# Patient Record
Sex: MALE | Race: WHITE | Employment: UNEMPLOYED | URBAN - METROPOLITAN AREA
[De-identification: names, ages, dates, MRNs, and addresses within clinical notes are randomized per-mention and may not be internally consistent; named-entity substitution may affect disease eponyms.]

---
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
## 2021-02-21 DIAGNOSIS — L74.9 SWEATING ABNORMALITY: ICD-10-CM

## 2021-02-22 NOTE — TELEPHONE ENCOUNTER
Rx requested:  Requested Prescriptions     Pending Prescriptions Disp Refills    aluminum chloride (DRYSOL) 20 % external solution [Pharmacy Med Name: Drysol Dab-O-Matic 20 % topical solution] 35 mL 3     Sig: Apply topically nightly until sweat decreased then use once weekly       Last Office Visit:   6/8/2020      Last filled:  8/12/2020    Next Visit Date:  No future appointments.

## 2021-03-27 DIAGNOSIS — R11.15 NON-INTRACTABLE CYCLICAL VOMITING WITHOUT NAUSEA: ICD-10-CM

## 2021-03-27 DIAGNOSIS — F32.89 OTHER DEPRESSION: ICD-10-CM

## 2021-03-29 RX ORDER — FLUOXETINE 10 MG/1
10 CAPSULE ORAL DAILY
Qty: 60 CAPSULE | Refills: 2 | Status: SHIPPED | OUTPATIENT
Start: 2021-03-29 | End: 2021-09-17

## 2021-03-29 RX ORDER — OMEPRAZOLE 20 MG/1
20 CAPSULE, DELAYED RELEASE ORAL 2 TIMES DAILY
Qty: 60 CAPSULE | Refills: 2 | Status: SHIPPED | OUTPATIENT
Start: 2021-03-29 | End: 2021-06-07

## 2021-03-29 NOTE — TELEPHONE ENCOUNTER
Rx requested:  Requested Prescriptions     Pending Prescriptions Disp Refills    omeprazole (PRILOSEC) 20 MG delayed release capsule [Pharmacy Med Name: omeprazole 20 mg capsule,delayed release] 60 capsule 2     Sig: Take 1 capsule by mouth 2 times daily    FLUoxetine (PROZAC) 10 MG capsule [Pharmacy Med Name: fluoxetine 10 mg capsule] 60 capsule 2     Sig: TAKE 1 CAPSULE BY MOUTH DAILY       Last Office Visit:   6/8/2020      Last filled:  Omeprazole 12/18/2020  Prozac 8/12/2020    Next Visit Date:  No future appointments.

## 2021-05-03 RX ORDER — GLYCOPYRROLATE 1 MG/1
TABLET ORAL
Qty: 120 TABLET | Refills: 5 | OUTPATIENT
Start: 2021-05-03

## 2021-05-14 DIAGNOSIS — F51.01 PRIMARY INSOMNIA: ICD-10-CM

## 2021-05-14 DIAGNOSIS — M54.50 ACUTE BILATERAL LOW BACK PAIN WITHOUT SCIATICA: ICD-10-CM

## 2021-05-14 RX ORDER — MELATONIN 10 MG
CAPSULE ORAL
Qty: 30 CAPSULE | Refills: 5 | OUTPATIENT
Start: 2021-05-14

## 2021-05-14 RX ORDER — MELOXICAM 7.5 MG/1
7.5 TABLET ORAL DAILY
Qty: 90 TABLET | Refills: 1 | OUTPATIENT
Start: 2021-05-14

## 2021-05-17 ENCOUNTER — OFFICE VISIT (OUTPATIENT)
Dept: FAMILY MEDICINE CLINIC | Age: 21
End: 2021-05-17
Payer: COMMERCIAL

## 2021-05-17 VITALS
WEIGHT: 204 LBS | TEMPERATURE: 97.3 F | OXYGEN SATURATION: 96 % | HEIGHT: 74 IN | DIASTOLIC BLOOD PRESSURE: 82 MMHG | HEART RATE: 84 BPM | BODY MASS INDEX: 26.18 KG/M2 | SYSTOLIC BLOOD PRESSURE: 138 MMHG

## 2021-05-17 DIAGNOSIS — B02.30 HERPES ZOSTER WITH OPHTHALMIC COMPLICATION, UNSPECIFIED HERPES ZOSTER EYE DISEASE: Primary | ICD-10-CM

## 2021-05-17 DIAGNOSIS — M54.50 ACUTE BILATERAL LOW BACK PAIN WITHOUT SCIATICA: ICD-10-CM

## 2021-05-17 DIAGNOSIS — L03.213 PRESEPTAL CELLULITIS OF RIGHT EYE: ICD-10-CM

## 2021-05-17 DIAGNOSIS — H10.31 ACUTE BACTERIAL CONJUNCTIVITIS OF RIGHT EYE: ICD-10-CM

## 2021-05-17 PROCEDURE — 99213 OFFICE O/P EST LOW 20 MIN: CPT | Performed by: FAMILY MEDICINE

## 2021-05-17 PROCEDURE — G8427 DOCREV CUR MEDS BY ELIG CLIN: HCPCS | Performed by: FAMILY MEDICINE

## 2021-05-17 PROCEDURE — 1036F TOBACCO NON-USER: CPT | Performed by: FAMILY MEDICINE

## 2021-05-17 PROCEDURE — G8417 CALC BMI ABV UP PARAM F/U: HCPCS | Performed by: FAMILY MEDICINE

## 2021-05-17 RX ORDER — BACITRACIN ZINC AND POLYMYXIN B SULFATE 500; 10000 [USP'U]/G; [USP'U]/G
0.5 OINTMENT OPHTHALMIC 2 TIMES DAILY
Qty: 3.5 G | Refills: 0 | Status: SHIPPED | OUTPATIENT
Start: 2021-05-17 | End: 2021-07-26 | Stop reason: SDUPTHER

## 2021-05-17 RX ORDER — MELOXICAM 7.5 MG/1
7.5 TABLET ORAL DAILY
Qty: 90 TABLET | Refills: 2 | Status: SHIPPED | OUTPATIENT
Start: 2021-05-17 | End: 2022-03-07

## 2021-05-17 RX ORDER — VALACYCLOVIR HYDROCHLORIDE 1 G/1
1000 TABLET, FILM COATED ORAL 2 TIMES DAILY
Qty: 20 TABLET | Refills: 0 | Status: SHIPPED | OUTPATIENT
Start: 2021-05-17 | End: 2021-07-26 | Stop reason: SDUPTHER

## 2021-05-17 RX ORDER — SULFAMETHOXAZOLE AND TRIMETHOPRIM 800; 160 MG/1; MG/1
1 TABLET ORAL 2 TIMES DAILY
Qty: 14 TABLET | Refills: 0 | Status: SHIPPED | OUTPATIENT
Start: 2021-05-17 | End: 2021-07-26 | Stop reason: SDUPTHER

## 2021-05-17 SDOH — ECONOMIC STABILITY: FOOD INSECURITY: WITHIN THE PAST 12 MONTHS, THE FOOD YOU BOUGHT JUST DIDN'T LAST AND YOU DIDN'T HAVE MONEY TO GET MORE.: NEVER TRUE

## 2021-05-17 SDOH — ECONOMIC STABILITY: FOOD INSECURITY: WITHIN THE PAST 12 MONTHS, YOU WORRIED THAT YOUR FOOD WOULD RUN OUT BEFORE YOU GOT MONEY TO BUY MORE.: NEVER TRUE

## 2021-05-17 ASSESSMENT — ENCOUNTER SYMPTOMS
CONSTIPATION: 0
DIARRHEA: 0
RHINORRHEA: 0
SHORTNESS OF BREATH: 0
SORE THROAT: 0
WHEEZING: 0
COUGH: 0
ABDOMINAL PAIN: 0

## 2021-05-17 ASSESSMENT — SOCIAL DETERMINANTS OF HEALTH (SDOH): HOW HARD IS IT FOR YOU TO PAY FOR THE VERY BASICS LIKE FOOD, HOUSING, MEDICAL CARE, AND HEATING?: NOT HARD AT ALL

## 2021-05-17 NOTE — PROGRESS NOTES
[address] [facility] PR[facility]  [address] [place] [id number]  Dept: [phone]  Dept Fax: : [phone]     Chief Complaint  Chief Complaint   Patient presents with    Medication Refill     mobic    Eye Problem     rash near right eye       HPI:  24 y.o.male who presents for the following:      R eye problem: x2 days with recurrence of rash outside the the R eye on the cheek; irritated; has been applying topical abx without relief. 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 HENT: Negative for congestion, rhinorrhea and sore throat. Respiratory: Negative for cough, shortness of breath and wheezing. Gastrointestinal: Negative for abdominal pain, constipation and diarrhea. Endocrine: Negative for polydipsia and polyuria. Genitourinary: Negative for dysuria, frequency and urgency. Skin: Positive for rash. Neurological: Negative for syncope, light-headedness, numbness and headaches. Psychiatric/Behavioral: Negative for sleep disturbance. The patient is not nervous/anxious.         Past Medical History:   Diagnosis Date    Asperger syndrome     Excessive sleepiness 1/10/2019    Heart murmur     Narcolepsy     Thyroglossal cyst      Past Surgical History:   Procedure Laterality Date    CIRCUMCISION      FRACTURE SURGERY      TYMPANOSTOMY TUBE PLACEMENT       Social History     Socioeconomic History    Marital status: Single     Spouse name: Not on file    Number of children: Not on file    Years of education: Not on file    Highest education level: Not on file   Occupational History    Not on file   Tobacco Use    Smoking status: Passive Smoke Exposure - Never Smoker    Smokeless tobacco: Never Used   Vaping Use    Vaping Use: Never used   Substance and Sexual Activity    Alcohol use: No    Drug use: No    Sexual activity: Yes     Partners: Female   Other Topics Concern    Not on file   Social History Narrative    Not on file     Social Determinants of Health     Financial Resource Strain: Low Risk     Difficulty of Paying Living Expenses: Not hard at all   Food Insecurity: No Food Insecurity    Worried About Running Out of Food in the Last Year: Never true    [address] in the Last Year: Never true   Transportation Needs:     Lack of Transportation (Medical):  Lack of Transportation (Non-Medical):    Physical Activity:     Days of Exercise per Week:     Minutes of Exercise per Session:    Stress:     Feeling of Stress :    Social Connections:     Frequency of Communication with Friends and Family:     Frequency of Social Gatherings with Friends and Family:     Attends Gnosticist Services:     Active Member of Clubs or Organizations:     Attends Club or Organization Meetings:     Marital Status:    Intimate Partner Violence:     Fear of Current or Ex-Partner:     Emotionally Abused:     Physically Abused:     Sexually Abused:      Family History   Problem Relation Age of Onset    Cancer Maternal Grandfather         brain    Cancer Paternal Grandfather 61        lung, brain      Allergies   Allergen Reactions    Seasonal      Current Outpatient Medications   Medication Sig Dispense Refill    meloxicam (MOBIC) 7.5 MG tablet Take 1 tablet by mouth daily 90 tablet 2    valACYclovir (VALTREX) 1 g tablet Take 1 tablet by mouth 2 times daily for 10 days 20 tablet 0    sulfamethoxazole-trimethoprim (BACTRIM DS;SEPTRA DS) 800-160 MG per tablet Take 1 tablet by mouth 2 times daily for 7 days 14 tablet 0    bacitracin-polymyxin b (POLYSPORIN) 500-71419 UNIT/GM ophthalmic ointment Place 0.5 inches into the right eye 2 times daily for 10 days Every 12 hours.  3.5 g 0    omeprazole (PRILOSEC) 20 MG delayed release capsule Take 1 capsule by mouth 2 times daily 60 capsule 2    aluminum chloride (DRYSOL) 20 % external solution Apply topically nightly until sweat decreased then use once weekly 35 mL 3    melatonin 10 MG CAPS capsule Take 10 mg by mouth nightly as needed (insomnia) 30 capsule 5    glycopyrrolate (ROBINUL) 1 MG tablet TAKE 2 TABLETS BY MOUTH TWICE DAILY 120 tablet 5    modafinil (PROVIGIL) 100 MG tablet Take 100 mg by mouth once as needed (Takes in the afternoon).  modafinil (PROVIGIL) 200 MG tablet Take 200 mg by mouth daily.  Benzoyl Peroxide (PANOXYL WASH) 10 % external wash Apply topically 2 times daily. 156 g 1    ibuprofen (ADVIL;MOTRIN) 600 MG tablet TAKE 1 TABLET BY MOUTH EVERY 6 HOURS AS NEEDED FOR PAIN 30 tablet 0    clotrimazole (LOTRIMIN) 1 % cream Apply topically 2 times daily. 15 g 2    fluticasone (FLONASE) 50 MCG/ACT nasal spray 1 spray by Nasal route daily 1 Bottle 3    albuterol sulfate  (90 Base) MCG/ACT inhaler Inhale 2 puffs into the lungs 4 times daily as needed for Wheezing or Shortness of Breath 1 Inhaler 5    sildenafil (VIAGRA) 25 MG tablet TAKE TWO TABLETS BY MOUTH DAILY for 360 days  2    FLUoxetine (PROZAC) 10 MG capsule TAKE 1 CAPSULE BY MOUTH DAILY 60 capsule 2    sodium oxybate (XYREM) 500 MG/ML SOLN solution Take 4.5 mLs by mouth 2 times daily for 30 days. 1 Bottle 3    sodium oxybate (XYREM) 500 MG/ML SOLN solution Take 4.5 mLs by mouth 2 times daily for 30 days. 1 Bottle 5     No current facility-administered medications for this visit. Vitals:    05/17/21 1633   BP: 138/82   Pulse: 84   Temp: 97.3 °F (36.3 °C)   SpO2: 96%   Weight: 204 lb (92.5 kg)   Height: 6' 2\" (1.88 m)       Physical exam:  Physical Exam  Vitals reviewed. Constitutional:       General: He is not in acute distress. Appearance: He is well-developed. HENT:      Head: Normocephalic and atraumatic. Cardiovascular:      Rate and Rhythm: Normal rate.    Pulmonary:      Effort: Pulmonary effort is normal. No respiratory distress. Musculoskeletal:      Cervical back: Normal range of motion. Skin:     General: Skin is warm and dry. Neurological:      Mental Status: He is alert and oriented to person, place, and time. Psychiatric:         Behavior: Behavior normal.         Assessment/Plan:  24 y.o. male here mainly for face rash:  - Looks like recurrence of the shingles; will tx aggressively like the last 2 times given the importance of the area (antiviral, PO abx, eye abx). Could consider descalating to simply antiviral in the future recurrences     Diagnosis Orders   1. Herpes zoster with ophthalmic complication, unspecified herpes zoster eye disease  valACYclovir (VALTREX) 1 g tablet   2. Acute bilateral low back pain without sciatica  meloxicam (MOBIC) 7.5 MG tablet   3. Preseptal cellulitis of right eye  sulfamethoxazole-trimethoprim (BACTRIM DS;SEPTRA DS) 800-160 MG per tablet   4. Acute bacterial conjunctivitis of right eye  bacitracin-polymyxin b (POLYSPORIN) 500-47897 UNIT/GM ophthalmic ointment        Return if symptoms worsen or fail to improve.     Jericho Izaguirre MD

## 2021-05-31 DIAGNOSIS — F51.01 PRIMARY INSOMNIA: ICD-10-CM

## 2021-06-01 RX ORDER — GLYCOPYRROLATE 1 MG/1
TABLET ORAL
Qty: 120 TABLET | Refills: 5 | OUTPATIENT
Start: 2021-06-01

## 2021-06-01 RX ORDER — MELATONIN 10 MG
CAPSULE ORAL
Qty: 30 CAPSULE | Refills: 5 | Status: SHIPPED | OUTPATIENT
Start: 2021-06-01 | End: 2021-11-28

## 2021-06-01 NOTE — TELEPHONE ENCOUNTER
Rx requested:  Requested Prescriptions     Pending Prescriptions Disp Refills    melatonin 10 MG CAPS capsule [Pharmacy Med Name: melatonin 10 mg capsule] 30 capsule 5     Sig: TAKE 1 CAPSULE BY MOUTH NIGHTLY AS NEEDED FOR INSOMNIA       Last Office Visit:   5/17/2021      Last filled:  12/20/21    Next Visit Date:  No future appointments.

## 2021-06-06 DIAGNOSIS — L74.9 SWEATING ABNORMALITY: ICD-10-CM

## 2021-06-06 DIAGNOSIS — R11.15 NON-INTRACTABLE CYCLICAL VOMITING WITHOUT NAUSEA: ICD-10-CM

## 2021-06-07 RX ORDER — OMEPRAZOLE 20 MG/1
20 CAPSULE, DELAYED RELEASE ORAL 2 TIMES DAILY
Qty: 60 CAPSULE | Refills: 2 | Status: SHIPPED | OUTPATIENT
Start: 2021-06-07 | End: 2021-09-17

## 2021-06-07 NOTE — TELEPHONE ENCOUNTER
Rx requested:  Requested Prescriptions     Pending Prescriptions Disp Refills    aluminum chloride (DRYSOL) 20 % external solution [Pharmacy Med Name: Drysol Dab-O-Matic 20 % topical solution] 35 mL 3     Sig: Apply topically nightly until sweat decreases then use once weekly    omeprazole (PRILOSEC) 20 MG delayed release capsule [Pharmacy Med Name: omeprazole 20 mg capsule,delayed release] 60 capsule 2     Sig: Take 1 capsule by mouth 2 times daily       Last Office Visit:   5/17/2021      Last filled:  3/29/2021    Next Visit Date:  No future appointments.

## 2021-06-30 RX ORDER — GLYCOPYRROLATE 1 MG/1
TABLET ORAL
Qty: 120 TABLET | Refills: 5 | OUTPATIENT
Start: 2021-06-30

## 2021-07-26 ENCOUNTER — VIRTUAL VISIT (OUTPATIENT)
Dept: FAMILY MEDICINE CLINIC | Age: 21
End: 2021-07-26
Payer: COMMERCIAL

## 2021-07-26 DIAGNOSIS — B02.30 HERPES ZOSTER WITH OPHTHALMIC COMPLICATION, UNSPECIFIED HERPES ZOSTER EYE DISEASE: Primary | ICD-10-CM

## 2021-07-26 DIAGNOSIS — L03.213 PRESEPTAL CELLULITIS OF RIGHT EYE: ICD-10-CM

## 2021-07-26 DIAGNOSIS — L74.9 SWEATING ABNORMALITY: ICD-10-CM

## 2021-07-26 DIAGNOSIS — H10.31 ACUTE BACTERIAL CONJUNCTIVITIS OF RIGHT EYE: ICD-10-CM

## 2021-07-26 PROCEDURE — G8427 DOCREV CUR MEDS BY ELIG CLIN: HCPCS | Performed by: FAMILY MEDICINE

## 2021-07-26 PROCEDURE — 1036F TOBACCO NON-USER: CPT | Performed by: FAMILY MEDICINE

## 2021-07-26 PROCEDURE — G8417 CALC BMI ABV UP PARAM F/U: HCPCS | Performed by: FAMILY MEDICINE

## 2021-07-26 PROCEDURE — 99213 OFFICE O/P EST LOW 20 MIN: CPT | Performed by: FAMILY MEDICINE

## 2021-07-26 RX ORDER — GLYCOPYRROLATE 1 MG/1
TABLET ORAL
Qty: 120 TABLET | Refills: 5 | Status: SHIPPED | OUTPATIENT
Start: 2021-07-26 | End: 2022-02-07

## 2021-07-26 RX ORDER — BACITRACIN ZINC AND POLYMYXIN B SULFATE 500; 10000 [USP'U]/G; [USP'U]/G
0.5 OINTMENT OPHTHALMIC 2 TIMES DAILY
Qty: 3.5 G | Refills: 1 | Status: SHIPPED | OUTPATIENT
Start: 2021-07-26 | End: 2021-08-05

## 2021-07-26 RX ORDER — SULFAMETHOXAZOLE AND TRIMETHOPRIM 800; 160 MG/1; MG/1
1 TABLET ORAL 2 TIMES DAILY
Qty: 14 TABLET | Refills: 1 | Status: SHIPPED | OUTPATIENT
Start: 2021-07-26 | End: 2021-08-02

## 2021-07-26 RX ORDER — VALACYCLOVIR HYDROCHLORIDE 1 G/1
1000 TABLET, FILM COATED ORAL 2 TIMES DAILY
Qty: 20 TABLET | Refills: 1 | Status: SHIPPED | OUTPATIENT
Start: 2021-07-26 | End: 2022-04-04 | Stop reason: SDUPTHER

## 2021-07-26 RX ORDER — ARMODAFINIL 200 MG/1
TABLET ORAL
COMMUNITY
Start: 2021-07-01

## 2021-07-26 ASSESSMENT — ENCOUNTER SYMPTOMS
DIARRHEA: 0
CONSTIPATION: 0
COUGH: 0
SHORTNESS OF BREATH: 0
RHINORRHEA: 0
ABDOMINAL PAIN: 0
SORE THROAT: 0
WHEEZING: 0

## 2021-07-26 NOTE — PROGRESS NOTES
[address]  Virtual Visit  [address] [facility] [address] [facility] PRIMARY [facility]ARE  [phone] [id number]  Dept: [phone]  Dept Fax: : [phone]     Due to COVID 23 outbreak, patient's office visit was converted to a virtual visit. Patient was contacted and agreed to proceed with a virtual visit via [employer]. me  The risks and benefits of converting to a virtual visit were discussed in light of the current infectious disease epidemic. Patient also understood that insurance coverage and co-pays are up to their individual insurance plans. Chief Complaint  Chief Complaint   Patient presents with    Herpes Zoster     x 3 days, eyes, swelling     Discuss Medications     sweating       HPI:  24 y.o.male who presents for the following:      Face rash: thinks has recurrence of the R face/eye shingles; this will be the 3rd recurrence. No vision changes or eye pain but red lesions are starting up on the upper lid. 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 HENT: Negative for congestion, rhinorrhea and sore throat. Respiratory: Negative for cough, shortness of breath and wheezing. Gastrointestinal: Negative for abdominal pain, constipation and diarrhea. Endocrine: Negative for polydipsia and polyuria. Genitourinary: Negative for dysuria, frequency and urgency. Skin: Positive for rash. Neurological: Negative for syncope, light-headedness, numbness and headaches. Psychiatric/Behavioral: Negative for sleep disturbance. The patient is not nervous/anxious.         Past Medical History:   Diagnosis Date    Asperger syndrome     Excessive sleepiness 1/10/2019    Heart murmur     Narcolepsy     Thyroglossal cyst      Past Surgical History:   Procedure Laterality Date    CIRCUMCISION in the afternoon AS NEEDED      valACYclovir (VALTREX) 1 g tablet Take 1 tablet by mouth 2 times daily for 10 days 20 tablet 1    sulfamethoxazole-trimethoprim (BACTRIM DS;SEPTRA DS) 800-160 MG per tablet Take 1 tablet by mouth 2 times daily for 7 days 14 tablet 1    bacitracin-polymyxin b (POLYSPORIN) 500-88782 UNIT/GM ophthalmic ointment Place 0.5 inches into the right eye 2 times daily for 10 days Every 12 hours. 3.5 g 1    glycopyrrolate (ROBINUL) 1 MG tablet TAKE 2 TABLETS BY MOUTH TWICE DAILY 120 tablet 5    aluminum chloride (DRYSOL) 20 % external solution Apply topically nightly until sweat decreases then use once weekly 35 mL 3    omeprazole (PRILOSEC) 20 MG delayed release capsule Take 1 capsule by mouth 2 times daily 60 capsule 2    melatonin 10 MG CAPS capsule TAKE 1 CAPSULE BY MOUTH NIGHTLY AS NEEDED FOR INSOMNIA 30 capsule 5    meloxicam (MOBIC) 7.5 MG tablet Take 1 tablet by mouth daily 90 tablet 2    modafinil (PROVIGIL) 100 MG tablet Take 100 mg by mouth once as needed (Takes in the afternoon).  modafinil (PROVIGIL) 200 MG tablet Take 200 mg by mouth daily.  Benzoyl Peroxide (PANOXYL WASH) 10 % external wash Apply topically 2 times daily. 156 g 1    ibuprofen (ADVIL;MOTRIN) 600 MG tablet TAKE 1 TABLET BY MOUTH EVERY 6 HOURS AS NEEDED FOR PAIN 30 tablet 0    clotrimazole (LOTRIMIN) 1 % cream Apply topically 2 times daily. 15 g 2    fluticasone (FLONASE) 50 MCG/ACT nasal spray 1 spray by Nasal route daily 1 Bottle 3    albuterol sulfate  (90 Base) MCG/ACT inhaler Inhale 2 puffs into the lungs 4 times daily as needed for Wheezing or Shortness of Breath 1 Inhaler 5    sildenafil (VIAGRA) 25 MG tablet TAKE TWO TABLETS BY MOUTH DAILY for 360 days  2    FLUoxetine (PROZAC) 10 MG capsule TAKE 1 CAPSULE BY MOUTH DAILY 60 capsule 2    sodium oxybate (XYREM) 500 MG/ML SOLN solution Take 4.5 mLs by mouth 2 times daily for 30 days.  1 Bottle 3     No current facility-administered medications for this visit. Physical exam:  Physical Exam  Vitals reviewed. Constitutional:       General: He is not in acute distress. Appearance: He is well-developed. HENT:      Head: Normocephalic and atraumatic. Cardiovascular:      Rate and Rhythm: Normal rate. Pulmonary:      Effort: Pulmonary effort is normal. No respiratory distress. Musculoskeletal:      Cervical back: Normal range of motion. Skin:     General: Skin is warm and dry. Neurological:      Mental Status: He is alert and oriented to person, place, and time. Psychiatric:         Behavior: Behavior normal.         Assessment/Plan:  24 y.o. male here mainly for Shingles:  - will treat aggressively since this involves the eye. Provided the usual combo with refills. Might consider daily valtrex in the future     Diagnosis Orders   1. Herpes zoster with ophthalmic complication, unspecified herpes zoster eye disease  valACYclovir (VALTREX) 1 g tablet   2. Preseptal cellulitis of right eye  sulfamethoxazole-trimethoprim (BACTRIM DS;SEPTRA DS) 800-160 MG per tablet   3. Acute bacterial conjunctivitis of right eye  bacitracin-polymyxin b (POLYSPORIN) 500-17476 UNIT/GM ophthalmic ointment   4. Sweating abnormality  glycopyrrolate (ROBINUL) 1 MG tablet        Return if symptoms worsen or fail to improve. Julia Villarreal MD       Pursuant to the emergency declaration under the [facility], [facility] waiver authority and the [employer] Act, this Virtual  Visit was conducted, with patient's consent, to reduce the patient's risk of exposure to COVID-19 and provide continuity of care for an established patient. Services were provided through a video synchronous discussion virtually to substitute for in-person clinic visit.

## 2021-09-17 DIAGNOSIS — R11.15 NON-INTRACTABLE CYCLICAL VOMITING WITHOUT NAUSEA: ICD-10-CM

## 2021-09-17 DIAGNOSIS — F32.89 OTHER DEPRESSION: ICD-10-CM

## 2021-09-17 RX ORDER — FLUOXETINE 10 MG/1
10 CAPSULE ORAL DAILY
Qty: 60 CAPSULE | Refills: 2 | Status: SHIPPED | OUTPATIENT
Start: 2021-09-17 | End: 2021-09-22 | Stop reason: ALTCHOICE

## 2021-09-17 RX ORDER — OMEPRAZOLE 20 MG/1
20 CAPSULE, DELAYED RELEASE ORAL 2 TIMES DAILY
Qty: 60 CAPSULE | Refills: 2 | Status: SHIPPED | OUTPATIENT
Start: 2021-09-17 | End: 2021-12-20

## 2021-09-22 ENCOUNTER — OFFICE VISIT (OUTPATIENT)
Dept: INTERNAL MEDICINE | Age: 21
End: 2021-09-22
Payer: COMMERCIAL

## 2021-09-22 VITALS
HEIGHT: 74 IN | WEIGHT: 201 LBS | SYSTOLIC BLOOD PRESSURE: 126 MMHG | TEMPERATURE: 98.2 F | DIASTOLIC BLOOD PRESSURE: 80 MMHG | BODY MASS INDEX: 25.8 KG/M2 | HEART RATE: 51 BPM | OXYGEN SATURATION: 98 %

## 2021-09-22 DIAGNOSIS — T14.8XXA PUNCTURE WOUND: Primary | ICD-10-CM

## 2021-09-22 PROCEDURE — 90715 TDAP VACCINE 7 YRS/> IM: CPT | Performed by: FAMILY MEDICINE

## 2021-09-22 PROCEDURE — 1036F TOBACCO NON-USER: CPT | Performed by: FAMILY MEDICINE

## 2021-09-22 PROCEDURE — 99213 OFFICE O/P EST LOW 20 MIN: CPT | Performed by: FAMILY MEDICINE

## 2021-09-22 PROCEDURE — G8417 CALC BMI ABV UP PARAM F/U: HCPCS | Performed by: FAMILY MEDICINE

## 2021-09-22 PROCEDURE — G8427 DOCREV CUR MEDS BY ELIG CLIN: HCPCS | Performed by: FAMILY MEDICINE

## 2021-09-22 PROCEDURE — 90471 IMMUNIZATION ADMIN: CPT | Performed by: FAMILY MEDICINE

## 2021-09-22 ASSESSMENT — ENCOUNTER SYMPTOMS
RHINORRHEA: 0
WHEEZING: 0
ABDOMINAL PAIN: 0
SORE THROAT: 0
CONSTIPATION: 0
COUGH: 0
DIARRHEA: 0
SHORTNESS OF BREATH: 0

## 2021-09-22 NOTE — PROGRESS NOTES
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 HENT: Negative for congestion, rhinorrhea and sore throat. Respiratory: Negative for cough, shortness of breath and wheezing. Gastrointestinal: Negative for abdominal pain, constipation and diarrhea. Endocrine: Negative for polydipsia and polyuria. Genitourinary: Negative for dysuria, frequency and urgency. Skin: Positive for wound. Neurological: Negative for syncope, light-headedness, numbness and headaches. Psychiatric/Behavioral: Negative for sleep disturbance. The patient is not nervous/anxious.         Past Medical History:   Diagnosis Date    Asperger syndrome     Excessive sleepiness 1/10/2019    Heart murmur     Narcolepsy     Thyroglossal cyst      Past Surgical History:   Procedure Laterality Date    CIRCUMCISION      FRACTURE SURGERY      TYMPANOSTOMY TUBE PLACEMENT       Social History     Socioeconomic History    Marital status: Single     Spouse name: Not on file    Number of children: Not on file    Years of education: Not on file    Highest education level: Not on file   Occupational History    Not on file   Tobacco Use    Smoking status: Passive Smoke Exposure - Never Smoker    Smokeless tobacco: Never Used   Vaping Use    Vaping Use: Never used   Substance and Sexual Activity    Alcohol use: No    Drug use: No    Sexual activity: Yes     Partners: Female   Other Topics Concern    Not on file   Social History Narrative    Not on file     Social Determinants of Health     Financial Resource Strain: Low Risk     Difficulty of Paying Living Expenses: Not hard at all   Food Insecurity: No Food Insecurity    Worried About Running Out of Food in the Last Year: Never true    [address] in the Last Year: Never true   Transportation Needs:     Lack of Transportation (Medical):      Lack of Transportation (Non-Medical):    Physical Activity:     Days of Exercise per Week:     Minutes of Exercise per Session:    Stress:     Feeling of Stress :    Social Connections:     Frequency of Communication with Friends and Family:     Frequency of Social Gatherings with Friends and Family:     Attends Muslim Services:     Active Member of Clubs or Organizations:     Attends Club or Organization Meetings:     Marital Status:    Intimate Partner Violence:     Fear of Current or Ex-Partner:     Emotionally Abused:     Physically Abused:     Sexually Abused:      Family History   Problem Relation Age of Onset    Cancer Maternal Grandfather         brain    Cancer Paternal Grandfather 61        lung, brain      Allergies   Allergen Reactions    Seasonal      Current Outpatient Medications   Medication Sig Dispense Refill    omeprazole (PRILOSEC) 20 MG delayed release capsule Take 1 capsule by mouth 2 times daily 60 capsule 2    Armodafinil 200 MG TABS TAKE 1 TABLET BY MOUTH DAILY WITH the 100 mg TABLET in the afternoon AS NEEDED      glycopyrrolate (ROBINUL) 1 MG tablet TAKE 2 TABLETS BY MOUTH TWICE DAILY 120 tablet 5    aluminum chloride (DRYSOL) 20 % external solution Apply topically nightly until sweat decreases then use once weekly 35 mL 3    melatonin 10 MG CAPS capsule TAKE 1 CAPSULE BY MOUTH NIGHTLY AS NEEDED FOR INSOMNIA 30 capsule 5    meloxicam (MOBIC) 7.5 MG tablet Take 1 tablet by mouth daily 90 tablet 2    modafinil (PROVIGIL) 100 MG tablet Take 100 mg by mouth once as needed (Takes in the afternoon).  modafinil (PROVIGIL) 200 MG tablet Take 200 mg by mouth daily.  Benzoyl Peroxide (PANOXYL WASH) 10 % external wash Apply topically 2 times daily. 156 g 1    ibuprofen (ADVIL;MOTRIN) 600 MG tablet TAKE 1 TABLET BY MOUTH EVERY 6 HOURS AS NEEDED FOR PAIN 30 tablet 0    clotrimazole (LOTRIMIN) 1 % cream Apply topically 2 times daily. 15 g 2    fluticasone (FLONASE) 50 MCG/ACT nasal spray 1 spray by Nasal route daily 1 Bottle 3    albuterol sulfate  (90 Base) MCG/ACT inhaler Inhale 2 puffs into the lungs 4 times daily as needed for Wheezing or Shortness of Breath 1 Inhaler 5    sildenafil (VIAGRA) 25 MG tablet TAKE TWO TABLETS BY MOUTH DAILY for 360 days  2    sodium oxybate (XYREM) 500 MG/ML SOLN solution Take 4.5 mLs by mouth 2 times daily for 30 days. 1 Bottle 3     No current facility-administered medications for this visit. Vitals:    09/22/21 1103   BP: 126/80   Site: Left Upper Arm   Position: Sitting   Cuff Size: Large Adult   Pulse: 51   Temp: 98.2 °F (36.8 °C)   TempSrc: Temporal   SpO2: 98%   Weight: 201 lb (91.2 kg)   Height: 6' 2\" (1.88 m)       Physical exam:  Physical Exam  Vitals reviewed. Constitutional:       General: He is not in acute distress. Appearance: He is well-developed. HENT:      Head: Normocephalic and atraumatic. Cardiovascular:      Rate and Rhythm: Normal rate. Pulmonary:      Effort: Pulmonary effort is normal. No respiratory distress. Musculoskeletal:      Cervical back: Normal range of motion. Skin:     General: Skin is warm and dry. Neurological:      Mental Status: He is alert and oriented to person, place, and time. Psychiatric:         Behavior: Behavior normal.         Assessment/Plan:  22 y.o. male here mainly for L foot puncture wound:  - updated his Tdap today; no signs on infection of the foot     Diagnosis Orders   1.  Puncture wound  Tdap (age 6y and older) IM ([employer] [facility]) Return if symptoms worsen or fail to improve.     Ro Mann MD

## 2021-10-02 DIAGNOSIS — L74.9 SWEATING ABNORMALITY: ICD-10-CM

## 2021-11-27 DIAGNOSIS — F51.01 PRIMARY INSOMNIA: ICD-10-CM

## 2021-11-28 RX ORDER — MELATONIN 10 MG
CAPSULE ORAL
Qty: 30 CAPSULE | Refills: 5 | Status: SHIPPED | OUTPATIENT
Start: 2021-11-28 | End: 2022-06-07

## 2021-11-28 NOTE — TELEPHONE ENCOUNTER
Comments: none    Last Office Visit (last PCP visit):   9/22/2021    Next Visit Date:  No future appointments. **If hasn't been seen in over a year OR hasn't followed up according to last diabetes/ADHD visit, make appointment for patient before sending refill to provider.     Rx requested:  Requested Prescriptions     Pending Prescriptions Disp Refills    melatonin 10 MG CAPS capsule [Pharmacy Med Name: melatonin 10 mg capsule] 30 capsule 5     Sig: TAKE 1 CAPSULE BY MOUTH NIGHTLY AS NEEDED FOR INSOMNIA

## 2022-02-07 DIAGNOSIS — L74.9 SWEATING ABNORMALITY: ICD-10-CM

## 2022-02-07 RX ORDER — GLYCOPYRROLATE 1 MG/1
TABLET ORAL
Qty: 120 TABLET | Refills: 5 | Status: SHIPPED | OUTPATIENT
Start: 2022-02-07 | End: 2022-07-19

## 2022-02-07 NOTE — TELEPHONE ENCOUNTER
Comments:     Last Office Visit (last PCP visit):   9/22/2021    Next Visit Date:  No future appointments. **If hasn't been seen in over a year OR hasn't followed up according to last diabetes/ADHD visit, make appointment for patient before sending refill to provider.     Rx requested:  Requested Prescriptions     Pending Prescriptions Disp Refills    glycopyrrolate (ROBINUL) 1 MG tablet [Pharmacy Med Name: glycopyrrolate 1 mg tablet] 120 tablet 5     Sig: TAKE 2 TABLETS BY MOUTH TWICE DAILY

## 2022-03-04 DIAGNOSIS — M54.50 ACUTE BILATERAL LOW BACK PAIN WITHOUT SCIATICA: ICD-10-CM

## 2022-03-04 DIAGNOSIS — L74.9 SWEATING ABNORMALITY: ICD-10-CM

## 2022-03-07 RX ORDER — MELOXICAM 7.5 MG/1
7.5 TABLET ORAL DAILY
Qty: 90 TABLET | Refills: 2 | Status: SHIPPED | OUTPATIENT
Start: 2022-03-07

## 2022-03-07 NOTE — TELEPHONE ENCOUNTER
Comments:     Last Office Visit (last PCP visit):   9/22/2021    Next Visit Date:  No future appointments. **If hasn't been seen in over a year OR hasn't followed up according to last diabetes/ADHD visit, make appointment for patient before sending refill to provider.     Rx requested:  Requested Prescriptions     Pending Prescriptions Disp Refills    meloxicam (MOBIC) 7.5 MG tablet [Pharmacy Med Name: meloxicam 7.5 mg tablet] 90 tablet 2     Sig: Take 1 tablet by mouth daily    aluminum chloride (DRYSOL) 20 % external solution [Pharmacy Med Name: Drysol Dab-O-Matic 20 % topical solution] 35 mL 2     Sig: Apply topically nightly until sweat decreases then use once weekly

## 2022-04-04 DIAGNOSIS — L01.00 IMPETIGO: ICD-10-CM

## 2022-04-04 DIAGNOSIS — B02.30 HERPES ZOSTER WITH OPHTHALMIC COMPLICATION, UNSPECIFIED HERPES ZOSTER EYE DISEASE: ICD-10-CM

## 2022-04-04 RX ORDER — VALACYCLOVIR HYDROCHLORIDE 1 G/1
1000 TABLET, FILM COATED ORAL 2 TIMES DAILY
Qty: 20 TABLET | Refills: 1 | Status: SHIPPED | OUTPATIENT
Start: 2022-04-04 | End: 2022-04-14

## 2022-04-04 NOTE — TELEPHONE ENCOUNTER
Comments:     Last Office Visit (last PCP visit):   9/22/2021    Next Visit Date:  No future appointments. **If hasn't been seen in over a year OR hasn't followed up according to last diabetes/ADHD visit, make appointment for patient before sending refill to provider.     Rx requested:  Requested Prescriptions     Pending Prescriptions Disp Refills    valACYclovir (VALTREX) 1 g tablet 20 tablet 1     Sig: Take 1 tablet by mouth 2 times daily for 10 days    mupirocin (BACTROBAN) 2 % ointment 15 g 1     Sig: Apply 3 times daily over forehead rash

## 2022-04-08 DIAGNOSIS — F32.89 OTHER DEPRESSION: ICD-10-CM

## 2022-04-08 RX ORDER — FLUOXETINE 10 MG/1
10 CAPSULE ORAL DAILY
Qty: 60 CAPSULE | Refills: 2 | Status: SHIPPED | OUTPATIENT
Start: 2022-04-08 | End: 2022-06-03

## 2022-04-08 NOTE — TELEPHONE ENCOUNTER
Comments:     Last Office Visit (last PCP visit):   9/22/2021    Next Visit Date:  No future appointments. **If hasn't been seen in over a year OR hasn't followed up according to last diabetes/ADHD visit, make appointment for patient before sending refill to provider.     Rx requested:  Requested Prescriptions     Pending Prescriptions Disp Refills    FLUoxetine (PROZAC) 10 MG capsule [Pharmacy Med Name: fluoxetine 10 mg capsule] 60 capsule 2     Sig: TAKE 1 CAPSULE BY MOUTH DAILY

## 2022-05-19 ENCOUNTER — TELEPHONE (OUTPATIENT)
Dept: FAMILY MEDICINE CLINIC | Age: 22
End: 2022-05-19

## 2022-05-19 NOTE — TELEPHONE ENCOUNTER
----- Message from [address] [facility] sent at 5/19/2022  3:48 PM EDT -----  Subject: Message to Provider    QUESTIONS  Information for Provider? Pt has appt on 06/02/2022 for a physical and   wants to know if he can get COVID vaccine when he comes in. Please call pt   and let him know  ---------------------------------------------------------------------------  --------------  CALL BACK INFO  What is the best way for the office to contact you? OK to leave message on   voicemail  Preferred Call Back Phone Number? [phone]  ---------------------------------------------------------------------------  --------------  SCRIPT ANSWERS  Relationship to Patient?  Self

## 2022-06-03 ENCOUNTER — OFFICE VISIT (OUTPATIENT)
Dept: FAMILY MEDICINE CLINIC | Age: 22
End: 2022-06-03
Payer: COMMERCIAL

## 2022-06-03 VITALS
TEMPERATURE: 98.4 F | DIASTOLIC BLOOD PRESSURE: 88 MMHG | SYSTOLIC BLOOD PRESSURE: 130 MMHG | HEART RATE: 61 BPM | HEIGHT: 74 IN | WEIGHT: 200 LBS | OXYGEN SATURATION: 98 % | BODY MASS INDEX: 25.67 KG/M2

## 2022-06-03 DIAGNOSIS — Z00.00 ANNUAL PHYSICAL EXAM: Primary | ICD-10-CM

## 2022-06-03 DIAGNOSIS — R76.11 TUBERCULIN SKIN TEST (TST) POSITIVE: ICD-10-CM

## 2022-06-03 DIAGNOSIS — Z23 ENCOUNTER FOR VACCINATION: ICD-10-CM

## 2022-06-03 PROCEDURE — G8427 DOCREV CUR MEDS BY ELIG CLIN: HCPCS | Performed by: FAMILY MEDICINE

## 2022-06-03 PROCEDURE — 90632 HEPA VACCINE ADULT IM: CPT | Performed by: FAMILY MEDICINE

## 2022-06-03 PROCEDURE — 90471 IMMUNIZATION ADMIN: CPT | Performed by: FAMILY MEDICINE

## 2022-06-03 PROCEDURE — 90716 VAR VACCINE LIVE SUBQ: CPT | Performed by: FAMILY MEDICINE

## 2022-06-03 PROCEDURE — G8417 CALC BMI ABV UP PARAM F/U: HCPCS | Performed by: FAMILY MEDICINE

## 2022-06-03 PROCEDURE — 90472 IMMUNIZATION ADMIN EACH ADD: CPT | Performed by: FAMILY MEDICINE

## 2022-06-03 PROCEDURE — 99395 PREV VISIT EST AGE 18-39: CPT | Performed by: FAMILY MEDICINE

## 2022-06-03 PROCEDURE — 86580 TB INTRADERMAL TEST: CPT | Performed by: FAMILY MEDICINE

## 2022-06-03 PROCEDURE — 1036F TOBACCO NON-USER: CPT | Performed by: FAMILY MEDICINE

## 2022-06-03 RX ORDER — ARMODAFINIL 250 MG/1
TABLET ORAL
COMMUNITY
Start: 2022-04-28

## 2022-06-03 SDOH — ECONOMIC STABILITY: FOOD INSECURITY: WITHIN THE PAST 12 MONTHS, THE FOOD YOU BOUGHT JUST DIDN'T LAST AND YOU DIDN'T HAVE MONEY TO GET MORE.: NEVER TRUE

## 2022-06-03 SDOH — ECONOMIC STABILITY: FOOD INSECURITY: WITHIN THE PAST 12 MONTHS, YOU WORRIED THAT YOUR FOOD WOULD RUN OUT BEFORE YOU GOT MONEY TO BUY MORE.: NEVER TRUE

## 2022-06-03 ASSESSMENT — PATIENT HEALTH QUESTIONNAIRE - PHQ9
9. THOUGHTS THAT YOU WOULD BE BETTER OFF DEAD, OR OF HURTING YOURSELF: 0
8. MOVING OR SPEAKING SO SLOWLY THAT OTHER PEOPLE COULD HAVE NOTICED. OR THE OPPOSITE, BEING SO FIGETY OR RESTLESS THAT YOU HAVE BEEN MOVING AROUND A LOT MORE THAN USUAL: 1
SUM OF ALL RESPONSES TO PHQ QUESTIONS 1-9: 8
SUM OF ALL RESPONSES TO PHQ9 QUESTIONS 1 & 2: 1
6. FEELING BAD ABOUT YOURSELF - OR THAT YOU ARE A FAILURE OR HAVE LET YOURSELF OR YOUR FAMILY DOWN: 1
3. TROUBLE FALLING OR STAYING ASLEEP: 0
SUM OF ALL RESPONSES TO PHQ QUESTIONS 1-9: 8
4. FEELING TIRED OR HAVING LITTLE ENERGY: 3
1. LITTLE INTEREST OR PLEASURE IN DOING THINGS: 0
5. POOR APPETITE OR OVEREATING: 0
7. TROUBLE CONCENTRATING ON THINGS, SUCH AS READING THE NEWSPAPER OR WATCHING TELEVISION: 2
SUM OF ALL RESPONSES TO PHQ QUESTIONS 1-9: 8
2. FEELING DOWN, DEPRESSED OR HOPELESS: 1
10. IF YOU CHECKED OFF ANY PROBLEMS, HOW DIFFICULT HAVE THESE PROBLEMS MADE IT FOR YOU TO DO YOUR WORK, TAKE CARE OF THINGS AT HOME, OR GET ALONG WITH OTHER PEOPLE: 0
SUM OF ALL RESPONSES TO PHQ QUESTIONS 1-9: 8

## 2022-06-03 ASSESSMENT — ENCOUNTER SYMPTOMS
ABDOMINAL PAIN: 0
SHORTNESS OF BREATH: 0
DIARRHEA: 0
CONSTIPATION: 0
WHEEZING: 0
SORE THROAT: 0
COUGH: 0
RHINORRHEA: 0

## 2022-06-03 ASSESSMENT — SOCIAL DETERMINANTS OF HEALTH (SDOH): HOW HARD IS IT FOR YOU TO PAY FOR THE VERY BASICS LIKE FOOD, HOUSING, MEDICAL CARE, AND HEATING?: NOT HARD AT ALL

## 2022-06-03 NOTE — PROGRESS NOTES
[address] 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 HENT: Negative for congestion, rhinorrhea and sore throat. Respiratory: Negative for cough, shortness of breath and wheezing. Gastrointestinal: Negative for abdominal pain, constipation and diarrhea. Endocrine: Negative for polydipsia and polyuria. Genitourinary: Negative for dysuria, frequency and urgency. Neurological: Negative for syncope, light-headedness, numbness and headaches. Psychiatric/Behavioral: Negative for sleep disturbance. The patient is not nervous/anxious.         Past Medical History:   Diagnosis Date    Asperger syndrome     Excessive sleepiness 1/10/2019    Heart murmur     Narcolepsy     Thyroglossal cyst      Past Surgical History:   Procedure Laterality Date    CIRCUMCISION      FRACTURE SURGERY      TYMPANOSTOMY TUBE PLACEMENT       Social History     Socioeconomic History    Marital status: Single     Spouse name: Not on file    Number of children: Not on file    Years of education: Not on file    Highest education level: Not on file   Occupational History    Not on file   Tobacco Use    Smoking status: Passive Smoke Exposure - Never Smoker    Smokeless tobacco: Never Used   Vaping Use    Vaping Use: Never used   Substance and Sexual Activity    Alcohol use: No    Drug use: No    Sexual activity: Yes     Partners: Female   Other Topics Concern    Not on file   Social History Narrative    Not on file     Social Determinants of Health     Financial Resource Strain: Low Risk     Difficulty of Paying Living Expenses: Not hard at all   Food Insecurity: No Food Insecurity    Worried About Running Out of Food in the Last Year: Never true    [place] of Food in the Last Year: Never true   Transportation Needs:     Lack of Transportation (Medical): Not on file    Lack of Transportation (Non-Medical):  Not on file   Physical Activity:     Days of Exercise per Week: Not on file    Minutes of Exercise per Session: Not on file   Stress:     Feeling of Stress : Not on file   Social Connections:     Frequency of Communication with Friends and Family: Not on file    Frequency of Social Gatherings with Friends and Family: Not on file    Attends Advent Services: Not on file    Active Member of [address] [place] [address] rankur or Organizations: Not on file    Attends Club or Organization Meetings: Not on file    Marital Status: Not on file   Intimate Partner Violence:     Fear of Current or Ex-Partner: Not on file    Emotionally Abused: Not on file    Physically Abused: Not on file    Sexually Abused: Not on file   Housing Stability:     Unable to Pay for Housing in the Last Year: Not on file    Number of [place] in the Last Year: Not on file    Unstable Housing in the Last Year: Not on file     Family History   Problem Relation Age of Onset    Cancer Maternal Grandfather         brain    Cancer Paternal Grandfather 61        lung, brain      Allergies   Allergen Reactions    Seasonal      Current Outpatient Medications   Medication Sig Dispense Refill    Armodafinil 250 MG TABS TAKE 1 TABLET BY MOUTH EVERY MORNING      meloxicam (MOBIC) 7.5 MG tablet Take 1 tablet by mouth daily 90 tablet 2    aluminum chloride (DRYSOL) 20 % external solution Apply topically nightly until sweat decreases then use once weekly 35 mL 2    glycopyrrolate (ROBINUL) 1 MG tablet TAKE 2 TABLETS BY MOUTH TWICE DAILY 120 tablet 5    omeprazole (PRILOSEC) 20 MG delayed release capsule Take 1 capsule by mouth 2 times daily 60 capsule 5    melatonin 10 MG CAPS capsule TAKE 1 CAPSULE BY MOUTH NIGHTLY AS NEEDED FOR INSOMNIA 30 capsule 5    Armodafinil 200 MG TABS TAKE 1 TABLET BY MOUTH DAILY WITH the 100 mg TABLET in the afternoon AS NEEDED      sodium oxybate (XYREM) 500 MG/ML SOLN solution Take 4.5 mLs by mouth 2 times daily for 30 days. 1 Bottle 3    modafinil (PROVIGIL) 100 MG tablet Take 100 mg by mouth once as needed (Takes in the afternoon).  modafinil (PROVIGIL) 200 MG tablet Take 200 mg by mouth daily.  Benzoyl Peroxide (PANOXYL WASH) 10 % external wash Apply topically 2 times daily. 156 g 1    ibuprofen (ADVIL;MOTRIN) 600 MG tablet TAKE 1 TABLET BY MOUTH EVERY 6 HOURS AS NEEDED FOR PAIN 30 tablet 0    clotrimazole (LOTRIMIN) 1 % cream Apply topically 2 times daily. 15 g 2    fluticasone (FLONASE) 50 MCG/ACT nasal spray 1 spray by Nasal route daily 1 Bottle 3    albuterol sulfate  (90 Base) MCG/ACT inhaler Inhale 2 puffs into the lungs 4 times daily as needed for Wheezing or Shortness of Breath 1 Inhaler 5    sildenafil (VIAGRA) 25 MG tablet TAKE TWO TABLETS BY MOUTH DAILY for 360 days  2     No current facility-administered medications for this visit. Vitals:    06/03/22 1640   BP: 130/88   Pulse: 61   Temp: 98.4 °F (36.9 °C)   TempSrc: Infrared   SpO2: 98%   Weight: 200 lb (90.7 kg)   Height: 6' 2\" (1.88 m)       Physical exam:  Physical Exam  Vitals reviewed. Constitutional:       General: He is not in acute distress. Appearance: He is well-developed. HENT:      Head: Normocephalic and atraumatic. Mouth/Throat:      Pharynx: No oropharyngeal exudate. Neck:      Thyroid: No thyromegaly. Cardiovascular:      Rate and Rhythm: Normal rate and regular rhythm. Heart sounds: Normal heart sounds. No murmur heard. Pulmonary:      Effort: Pulmonary effort is normal. No respiratory distress.       Breath sounds: Normal breath sounds. No wheezing. Abdominal:      General: There is no distension. Palpations: Abdomen is soft. Tenderness: There is no abdominal tenderness. There is no guarding or rebound. Musculoskeletal:      Cervical back: Normal range of motion. Lymphadenopathy:      Cervical: No cervical adenopathy. Skin:     General: Skin is warm and dry. Neurological:      Mental Status: He is alert and oriented to person, place, and time. Psychiatric:         Behavior: Behavior normal.         Assessment/Plan:  22 y.o. male here mainly for school physical:  - will complete form as well as the immunizations/labs requested; appears to just need 2nd varicella as well as 2 step PPD     Diagnosis Orders   1. Annual physical exam     2. Encounter for vaccination  Hep A, HAVRIX, (age 23 yrs+), IM    Varicella, VARIVAX, (age 15 mo+), SC   3.  Tuberculin skin test (TST) positive  Mantoux testing        Return in 1 year (on 6/3/2023) for annual exam.    Donna Locke MD

## 2022-06-06 LAB
INDURATION: NORMAL
TB SKIN TEST: NORMAL

## 2022-06-07 DIAGNOSIS — L74.9 SWEATING ABNORMALITY: ICD-10-CM

## 2022-06-07 DIAGNOSIS — F51.01 PRIMARY INSOMNIA: ICD-10-CM

## 2022-06-07 RX ORDER — MELATONIN 10 MG
CAPSULE ORAL
Qty: 30 CAPSULE | Refills: 5 | Status: SHIPPED | OUTPATIENT
Start: 2022-06-07

## 2022-06-10 ENCOUNTER — NURSE ONLY (OUTPATIENT)
Dept: FAMILY MEDICINE CLINIC | Age: 22
End: 2022-06-10
Payer: COMMERCIAL

## 2022-06-10 DIAGNOSIS — Z11.1 PPD SCREENING TEST: Primary | ICD-10-CM

## 2022-06-10 PROCEDURE — 86580 TB INTRADERMAL TEST: CPT | Performed by: FAMILY MEDICINE

## 2022-06-10 NOTE — PROGRESS NOTES
Pt here for mantoux injection, tolerated procedure well. Was told to return Monday 6/13/22 for reading.

## 2022-06-13 LAB
INDURATION: NORMAL
TB SKIN TEST: NEGATIVE

## 2022-07-07 DIAGNOSIS — R11.15 NON-INTRACTABLE CYCLICAL VOMITING WITHOUT NAUSEA: ICD-10-CM

## 2022-07-08 RX ORDER — OMEPRAZOLE 20 MG/1
20 CAPSULE, DELAYED RELEASE ORAL 2 TIMES DAILY
Qty: 60 CAPSULE | Refills: 5 | Status: SHIPPED | OUTPATIENT
Start: 2022-07-08

## 2022-07-08 NOTE — TELEPHONE ENCOUNTER
Comments:     Last Office Visit (last PCP visit):   6/3/2022    Next Visit Date:  No future appointments. **If hasn't been seen in over a year OR hasn't followed up according to last diabetes/ADHD visit, make appointment for patient before sending refill to provider.     Rx requested:  Requested Prescriptions     Pending Prescriptions Disp Refills    omeprazole (PRILOSEC) 20 MG delayed release capsule [Pharmacy Med Name: omeprazole 20 mg capsule,delayed release] 60 capsule 5     Sig: Take 1 capsule by mouth 2 times daily

## 2022-07-18 DIAGNOSIS — L74.9 SWEATING ABNORMALITY: ICD-10-CM

## 2022-07-19 RX ORDER — GLYCOPYRROLATE 1 MG/1
TABLET ORAL
Qty: 120 TABLET | Refills: 5 | Status: SHIPPED | OUTPATIENT
Start: 2022-07-19

## 2022-07-19 NOTE — TELEPHONE ENCOUNTER
Comments:     Last Office Visit (last PCP visit):   6/3/2022    Next Visit Date:  No future appointments. **If hasn't been seen in over a year OR hasn't followed up according to last diabetes/ADHD visit, make appointment for patient before sending refill to provider.     Rx requested:  Requested Prescriptions     Pending Prescriptions Disp Refills    glycopyrrolate (ROBINUL) 1 MG tablet [Pharmacy Med Name: glycopyrrolate 1 mg tablet] 120 tablet 5     Sig: TAKE 2 TABLETS BY MOUTH TWICE DAILY

## 2022-08-12 DIAGNOSIS — L74.9 SWEATING ABNORMALITY: ICD-10-CM

## 2022-08-15 NOTE — TELEPHONE ENCOUNTER
Comments:     Last Office Visit (last PCP visit):   6/3/2022    Next Visit Date:  No future appointments. **If hasn't been seen in over a year OR hasn't followed up according to last diabetes/ADHD visit, make appointment for patient before sending refill to provider.     Rx requested:  Requested Prescriptions     Pending Prescriptions Disp Refills    aluminum chloride (DRYSOL) 20 % external solution [Pharmacy Med Name: Drysol Dab-O-Matic 20 % topical solution] 35 mL 2     Sig: Apply topically nightly until sweat decreases then use once weekly

## 2022-09-14 DIAGNOSIS — F32.89 OTHER DEPRESSION: ICD-10-CM

## 2022-09-14 DIAGNOSIS — L74.9 SWEATING ABNORMALITY: ICD-10-CM

## 2022-09-15 RX ORDER — FLUOXETINE 10 MG/1
10 CAPSULE ORAL DAILY
Qty: 60 CAPSULE | Refills: 2 | Status: SHIPPED | OUTPATIENT
Start: 2022-09-15 | End: 2022-10-15

## 2022-09-15 NOTE — TELEPHONE ENCOUNTER
Comments:     Last Office Visit (last PCP visit):   6/3/2022    Next Visit Date:  Future Appointments   Date Time Provider Samy Fox   9/16/2022  8:45 AM SCHEDULE, DARA ALMANZAR [facility]       **If hasn't been seen in over a year OR hasn't followed up according to last diabetes/ADHD visit, make appointment for patient before sending refill to provider.     Rx requested:  Requested Prescriptions     Pending Prescriptions Disp Refills    FLUoxetine (PROZAC) 10 MG capsule [Pharmacy Med Name: fluoxetine 10 mg capsule] 60 capsule 2     Sig: TAKE 1 CAPSULE BY MOUTH DAILY    aluminum chloride (DRYSOL) 20 % external solution [Pharmacy Med Name: Drysol Dab-O-Matic 20 % topical solution] 35 mL 2     Sig: Apply topically nightly until sweat decreases then use once weekly

## 2022-09-16 ENCOUNTER — NURSE ONLY (OUTPATIENT)
Dept: FAMILY MEDICINE CLINIC | Age: 22
End: 2022-09-16
Payer: COMMERCIAL

## 2022-09-16 DIAGNOSIS — Z23 NEED FOR INFLUENZA VACCINATION: Primary | ICD-10-CM

## 2022-09-16 PROCEDURE — 90674 CCIIV4 VAC NO PRSV 0.5 ML IM: CPT | Performed by: FAMILY MEDICINE

## 2022-09-16 PROCEDURE — 90471 IMMUNIZATION ADMIN: CPT | Performed by: FAMILY MEDICINE

## 2022-09-16 NOTE — PROGRESS NOTES
Vaccine Information Sheet, \"Influenza - Inactivated\" OR \"Live - Intranasal\"  given to Betsy Bright, or parent/legal guardian of  Betsy Bright and verbalized understanding. Patient responses:    Have you ever had a reaction to a flu vaccine? No  Are you able to eat eggs without adverse effects? Yes  Do you have any current illness? No  Have you ever had Guillian Ogden Syndrome? No    Flu vaccine given per order. Please see immunization tab.

## 2022-11-25 DIAGNOSIS — F51.01 PRIMARY INSOMNIA: ICD-10-CM

## 2022-11-25 DIAGNOSIS — M54.50 ACUTE BILATERAL LOW BACK PAIN WITHOUT SCIATICA: ICD-10-CM

## 2022-11-28 RX ORDER — MELOXICAM 7.5 MG/1
7.5 TABLET ORAL DAILY
Qty: 90 TABLET | Refills: 5 | Status: SHIPPED | OUTPATIENT
Start: 2022-11-28

## 2022-11-28 RX ORDER — MELATONIN 10 MG
CAPSULE ORAL
Qty: 30 CAPSULE | Refills: 5 | Status: SHIPPED | OUTPATIENT
Start: 2022-11-28

## 2022-11-28 NOTE — TELEPHONE ENCOUNTER
Comments:     Last Office Visit (last PCP visit):   6/3/2022    Next Visit Date:  No future appointments. **If hasn't been seen in over a year OR hasn't followed up according to last diabetes/ADHD visit, make appointment for patient before sending refill to provider.     Rx requested:  Requested Prescriptions     Pending Prescriptions Disp Refills    melatonin 10 MG CAPS capsule [Pharmacy Med Name: melatonin 10 mg capsule] 30 capsule 5     Sig: TAKE 1 CAPSULE BY MOUTH NIGHTLY AS NEEDED FOR INSOMNIA    meloxicam (MOBIC) 7.5 MG tablet [Pharmacy Med Name: meloxicam 7.5 mg tablet] 90 tablet 5     Sig: TAKE 1 TABLET BY MOUTH DAILY

## 2022-12-29 DIAGNOSIS — R11.15 NON-INTRACTABLE CYCLICAL VOMITING WITHOUT NAUSEA: ICD-10-CM

## 2022-12-29 RX ORDER — OMEPRAZOLE 20 MG/1
20 CAPSULE, DELAYED RELEASE ORAL 2 TIMES DAILY
Qty: 60 CAPSULE | Refills: 5 | Status: SHIPPED | OUTPATIENT
Start: 2022-12-29

## 2022-12-29 NOTE — TELEPHONE ENCOUNTER
Comments:     Last Office Visit (last PCP visit):   6/3/2022    Next Visit Date:  No future appointments. **If hasn't been seen in over a year OR hasn't followed up according to last diabetes/ADHD visit, make appointment for patient before sending refill to provider.     Rx requested:  Requested Prescriptions     Pending Prescriptions Disp Refills    omeprazole (PRILOSEC) 20 MG delayed release capsule [Pharmacy Med Name: omeprazole 20 mg capsule,delayed release] 60 capsule 5     Sig: Take 1 capsule by mouth 2 times daily

## 2023-01-26 DIAGNOSIS — L74.9 SWEATING ABNORMALITY: ICD-10-CM

## 2023-01-26 RX ORDER — GLYCOPYRROLATE 1 MG/1
TABLET ORAL
Qty: 120 TABLET | Refills: 5 | Status: SHIPPED | OUTPATIENT
Start: 2023-01-26

## 2023-03-30 DIAGNOSIS — F32.89 OTHER DEPRESSION: ICD-10-CM

## 2023-03-31 RX ORDER — FLUOXETINE 10 MG/1
10 CAPSULE ORAL DAILY
Qty: 60 CAPSULE | Refills: 2 | Status: SHIPPED | OUTPATIENT
Start: 2023-03-31 | End: 2023-04-30

## 2023-03-31 NOTE — TELEPHONE ENCOUNTER
Comments:     Last Office Visit (last PCP visit):   6/3/2022    Next Visit Date:  No future appointments. **If hasn't been seen in over a year OR hasn't followed up according to last diabetes/ADHD visit, make appointment for patient before sending refill to provider.     Rx requested:  Requested Prescriptions     Pending Prescriptions Disp Refills    FLUoxetine (PROZAC) 10 MG capsule [Pharmacy Med Name: fluoxetine 10 mg capsule] 60 capsule 2     Sig: TAKE 1 CAPSULE BY MOUTH DAILY

## 2023-05-17 ENCOUNTER — OFFICE VISIT (OUTPATIENT)
Dept: INTERNAL MEDICINE | Age: 23
End: 2023-05-17
Payer: COMMERCIAL

## 2023-05-17 VITALS
SYSTOLIC BLOOD PRESSURE: 114 MMHG | DIASTOLIC BLOOD PRESSURE: 80 MMHG | BODY MASS INDEX: 28.88 KG/M2 | HEART RATE: 77 BPM | HEIGHT: 74 IN | WEIGHT: 225 LBS | OXYGEN SATURATION: 97 %

## 2023-05-17 DIAGNOSIS — Z11.1 ENCOUNTER FOR PPD TEST: Primary | ICD-10-CM

## 2023-05-17 PROCEDURE — G8427 DOCREV CUR MEDS BY ELIG CLIN: HCPCS | Performed by: FAMILY MEDICINE

## 2023-05-17 PROCEDURE — 1036F TOBACCO NON-USER: CPT | Performed by: FAMILY MEDICINE

## 2023-05-17 PROCEDURE — G8417 CALC BMI ABV UP PARAM F/U: HCPCS | Performed by: FAMILY MEDICINE

## 2023-05-17 PROCEDURE — 99212 OFFICE O/P EST SF 10 MIN: CPT | Performed by: FAMILY MEDICINE

## 2023-05-17 SDOH — ECONOMIC STABILITY: INCOME INSECURITY: HOW HARD IS IT FOR YOU TO PAY FOR THE VERY BASICS LIKE FOOD, HOUSING, MEDICAL CARE, AND HEATING?: PATIENT DECLINED

## 2023-05-17 SDOH — ECONOMIC STABILITY: FOOD INSECURITY: WITHIN THE PAST 12 MONTHS, YOU WORRIED THAT YOUR FOOD WOULD RUN OUT BEFORE YOU GOT MONEY TO BUY MORE.: PATIENT DECLINED

## 2023-05-17 SDOH — ECONOMIC STABILITY: HOUSING INSECURITY
IN THE LAST 12 MONTHS, WAS THERE A TIME WHEN YOU DID NOT HAVE A STEADY PLACE TO SLEEP OR SLEPT IN A SHELTER (INCLUDING NOW)?: PATIENT REFUSED

## 2023-05-17 SDOH — ECONOMIC STABILITY: FOOD INSECURITY: WITHIN THE PAST 12 MONTHS, THE FOOD YOU BOUGHT JUST DIDN'T LAST AND YOU DIDN'T HAVE MONEY TO GET MORE.: PATIENT DECLINED

## 2023-05-17 ASSESSMENT — ENCOUNTER SYMPTOMS
ABDOMINAL PAIN: 0
CONSTIPATION: 0
SORE THROAT: 0
RHINORRHEA: 0
WHEEZING: 0
DIARRHEA: 0
SHORTNESS OF BREATH: 0
COUGH: 0

## 2023-05-17 ASSESSMENT — PATIENT HEALTH QUESTIONNAIRE - PHQ9: DEPRESSION UNABLE TO ASSESS: PT REFUSES

## 2023-05-17 NOTE — PROGRESS NOTES
Unknown    Difficulty of Paying Living Expenses: Patient refused   Food Insecurity: Unknown    Worried About Running Out of Food in the Last Year: Patient refused    [address] in the Last Year: Patient refused   Transportation Needs: Unknown    Lack of Transportation (Medical): Not on file    Lack of Transportation (Non-Medical): Patient refused   Physical Activity: Not on file   Stress: Not on file   Social Connections: Not on file   Intimate Partner Violence: Not on file   Housing Stability: Unknown    Unable to Pay for Housing in the Last Year: Not on file    Number of [place] in the Last Year: Not on file    Unstable Housing in the Last Year: Patient refused     Family History   Problem Relation Age of Onset    Cancer Maternal Grandfather         brain    Cancer Paternal Grandfather 61        lung, brain      Allergies   Allergen Reactions    Seasonal      Current Outpatient Medications   Medication Sig Dispense Refill    FLUoxetine (PROZAC) 10 MG capsule TAKE 1 CAPSULE BY MOUTH DAILY 60 capsule 2    glycopyrrolate (ROBINUL) 1 MG tablet TAKE 2 TABLETS BY MOUTH TWICE DAILY 120 tablet 5    omeprazole (PRILOSEC) 20 MG delayed release capsule Take 1 capsule by mouth 2 times daily 60 capsule 5    melatonin 10 MG CAPS capsule TAKE 1 CAPSULE BY MOUTH NIGHTLY AS NEEDED FOR INSOMNIA 30 capsule 5    meloxicam (MOBIC) 7.5 MG tablet TAKE 1 TABLET BY MOUTH DAILY 90 tablet 5    aluminum chloride (DRYSOL) 20 % external solution Apply topically nightly until sweat decreases then use once weekly 35 mL 2    Armodafinil 250 MG TABS TAKE 1 TABLET BY MOUTH EVERY MORNING      Armodafinil 200 MG TABS TAKE 1 TABLET BY MOUTH DAILY WITH the 100 mg TABLET in the afternoon AS NEEDED      sodium oxybate (XYREM) 500 MG/ML SOLN solution Take 4.5 mLs by mouth 2 times daily for 30 days. 1 Bottle 3    modafinil (PROVIGIL) 100 MG tablet Take 1 tablet by mouth once as needed (Takes in the afternoon).       modafinil (PROVIGIL) 200 MG

## 2023-05-19 ENCOUNTER — TELEPHONE (OUTPATIENT)
Dept: INTERNAL MEDICINE | Age: 23
End: 2023-05-19

## 2023-05-19 NOTE — TELEPHONE ENCOUNTER
PPD Reading Note  PPD read and results entered in [employer] [employer]. Result: 0 mm induration.   Interpretation: Negative  If test not read within 48-72 hours of initial placement, patient advised to repeat in other arm 1-3 weeks after this test.  Allergic reaction: no

## 2023-05-26 DIAGNOSIS — F51.01 PRIMARY INSOMNIA: ICD-10-CM

## 2023-05-30 RX ORDER — MELATONIN 10 MG
CAPSULE ORAL
Qty: 30 CAPSULE | Refills: 5 | Status: SHIPPED | OUTPATIENT
Start: 2023-05-30

## 2023-05-30 NOTE — TELEPHONE ENCOUNTER
Comments:     Last Office Visit (last PCP visit):   5/17/2023    Next Visit Date:  No future appointments. **If hasn't been seen in over a year OR hasn't followed up according to last diabetes/ADHD visit, make appointment for patient before sending refill to provider.     Rx requested:  Requested Prescriptions     Pending Prescriptions Disp Refills    melatonin 10 MG CAPS capsule [Pharmacy Med Name: melatonin 10 mg capsule] 30 capsule 5     Sig: TAKE 1 CAPSULE BY MOUTH NIGHTLY AS NEEDED FOR INSOMNIA

## 2023-06-26 DIAGNOSIS — R11.15 NON-INTRACTABLE CYCLICAL VOMITING WITHOUT NAUSEA: ICD-10-CM

## 2023-06-27 RX ORDER — OMEPRAZOLE 20 MG/1
20 CAPSULE, DELAYED RELEASE ORAL 2 TIMES DAILY
Qty: 60 CAPSULE | Refills: 5 | Status: SHIPPED | OUTPATIENT
Start: 2023-06-27

## 2023-08-14 ENCOUNTER — TELEPHONE (OUTPATIENT)
Dept: FAMILY MEDICINE CLINIC | Age: 23
End: 2023-08-14

## 2023-08-14 NOTE — TELEPHONE ENCOUNTER
Patient called to request an increase in his Glycopyrrolate from 1mg to 2mg.  Patient was advised that he would most likely need an appointment but he requested that a message be sent first.

## 2023-08-15 ENCOUNTER — OFFICE VISIT (OUTPATIENT)
Dept: FAMILY MEDICINE CLINIC | Age: 23
End: 2023-08-15
Payer: COMMERCIAL

## 2023-08-15 VITALS
SYSTOLIC BLOOD PRESSURE: 128 MMHG | OXYGEN SATURATION: 97 % | HEART RATE: 64 BPM | BODY MASS INDEX: 30.62 KG/M2 | DIASTOLIC BLOOD PRESSURE: 86 MMHG | HEIGHT: 73 IN | WEIGHT: 231 LBS | TEMPERATURE: 98.1 F

## 2023-08-15 DIAGNOSIS — L74.9 SWEATING ABNORMALITY: ICD-10-CM

## 2023-08-15 PROCEDURE — 1036F TOBACCO NON-USER: CPT | Performed by: FAMILY MEDICINE

## 2023-08-15 PROCEDURE — G8427 DOCREV CUR MEDS BY ELIG CLIN: HCPCS | Performed by: FAMILY MEDICINE

## 2023-08-15 PROCEDURE — 99213 OFFICE O/P EST LOW 20 MIN: CPT | Performed by: FAMILY MEDICINE

## 2023-08-15 PROCEDURE — G8417 CALC BMI ABV UP PARAM F/U: HCPCS | Performed by: FAMILY MEDICINE

## 2023-08-15 RX ORDER — GLYCOPYRROLATE 2 MG/1
2 TABLET ORAL 2 TIMES DAILY
Qty: 180 TABLET | Refills: 3 | Status: SHIPPED | OUTPATIENT
Start: 2023-08-15

## 2023-08-15 ASSESSMENT — ENCOUNTER SYMPTOMS
SHORTNESS OF BREATH: 0
RHINORRHEA: 0
CONSTIPATION: 0
SORE THROAT: 0
WHEEZING: 0
DIARRHEA: 0
ABDOMINAL PAIN: 0
COUGH: 0

## 2023-08-15 ASSESSMENT — PATIENT HEALTH QUESTIONNAIRE - PHQ9: DEPRESSION UNABLE TO ASSESS: PT REFUSES

## 2023-08-21 RX ORDER — GLYCOPYRROLATE 1 MG/1
TABLET ORAL
Qty: 120 TABLET | Refills: 0 | OUTPATIENT
Start: 2023-08-21

## 2024-03-17 DIAGNOSIS — L01.00 IMPETIGO: ICD-10-CM

## 2024-03-17 DIAGNOSIS — B02.30 HERPES ZOSTER WITH OPHTHALMIC COMPLICATION, UNSPECIFIED HERPES ZOSTER EYE DISEASE: ICD-10-CM

## 2024-03-18 RX ORDER — VALACYCLOVIR HYDROCHLORIDE 1 G/1
1000 TABLET, FILM COATED ORAL 2 TIMES DAILY
Qty: 20 TABLET | Refills: 1 | Status: SHIPPED | OUTPATIENT
Start: 2024-03-18 | End: 2024-04-07

## 2024-03-22 DIAGNOSIS — H10.31 ACUTE BACTERIAL CONJUNCTIVITIS OF RIGHT EYE: ICD-10-CM

## 2024-03-22 RX ORDER — BACITRACIN ZINC AND POLYMYXIN B SULFATE 500; 10000 [USP'U]/G; [USP'U]/G
OINTMENT OPHTHALMIC 2 TIMES DAILY
Qty: 3.5 G | Refills: 1 | Status: SHIPPED | OUTPATIENT
Start: 2024-03-22 | End: 2024-04-01

## 2024-03-22 NOTE — TELEPHONE ENCOUNTER
Comments:     Last Office Visit (last PCP visit):   8/15/2023    Next Visit Date:  No future appointments.    **If hasn't been seen in over a year OR hasn't followed up according to last diabetes/ADHD visit, make appointment for patient before sending refill to provider.    Rx requested:  Requested Prescriptions     Pending Prescriptions Disp Refills    bacitracin-polymyxin b (POLYSPORIN) [id number] UNIT/GM ophthalmic ointment [Pharmacy Med Name: bacitracin-polymyxin B 500 unit-10,000 unit/gram eye ointment] 3.5 g 1     Sig: Place 0.5 inches into the right eye 2 times daily for 10 days Every 12 hours.

## 2024-04-04 ENCOUNTER — OFFICE VISIT (OUTPATIENT)
Dept: FAMILY MEDICINE CLINIC | Age: 24
End: 2024-04-04
Payer: COMMERCIAL

## 2024-04-04 VITALS
HEIGHT: 73 IN | BODY MASS INDEX: 31.62 KG/M2 | OXYGEN SATURATION: 98 % | HEART RATE: 60 BPM | DIASTOLIC BLOOD PRESSURE: 82 MMHG | WEIGHT: 238.6 LBS | SYSTOLIC BLOOD PRESSURE: 110 MMHG

## 2024-04-04 DIAGNOSIS — G89.29 CHRONIC PAIN OF LEFT KNEE: Primary | ICD-10-CM

## 2024-04-04 DIAGNOSIS — M25.562 CHRONIC PAIN OF LEFT KNEE: Primary | ICD-10-CM

## 2024-04-04 PROCEDURE — G8417 CALC BMI ABV UP PARAM F/U: HCPCS | Performed by: FAMILY MEDICINE

## 2024-04-04 PROCEDURE — 99213 OFFICE O/P EST LOW 20 MIN: CPT | Performed by: FAMILY MEDICINE

## 2024-04-04 PROCEDURE — 1036F TOBACCO NON-USER: CPT | Performed by: FAMILY MEDICINE

## 2024-04-04 PROCEDURE — G8427 DOCREV CUR MEDS BY ELIG CLIN: HCPCS | Performed by: FAMILY MEDICINE

## 2024-04-04 ASSESSMENT — PATIENT HEALTH QUESTIONNAIRE - PHQ9
5. POOR APPETITE OR OVEREATING: NOT AT ALL
SUM OF ALL RESPONSES TO PHQ9 QUESTIONS 1 & 2: 0
4. FEELING TIRED OR HAVING LITTLE ENERGY: NOT AT ALL
SUM OF ALL RESPONSES TO PHQ QUESTIONS 1-9: 0
8. MOVING OR SPEAKING SO SLOWLY THAT OTHER PEOPLE COULD HAVE NOTICED. OR THE OPPOSITE, BEING SO FIGETY OR RESTLESS THAT YOU HAVE BEEN MOVING AROUND A LOT MORE THAN USUAL: NOT AT ALL
9. THOUGHTS THAT YOU WOULD BE BETTER OFF DEAD, OR OF HURTING YOURSELF: NOT AT ALL
6. FEELING BAD ABOUT YOURSELF - OR THAT YOU ARE A FAILURE OR HAVE LET YOURSELF OR YOUR FAMILY DOWN: NOT AT ALL
3. TROUBLE FALLING OR STAYING ASLEEP: NOT AT ALL
7. TROUBLE CONCENTRATING ON THINGS, SUCH AS READING THE NEWSPAPER OR WATCHING TELEVISION: NOT AT ALL
10. IF YOU CHECKED OFF ANY PROBLEMS, HOW DIFFICULT HAVE THESE PROBLEMS MADE IT FOR YOU TO DO YOUR WORK, TAKE CARE OF THINGS AT HOME, OR GET ALONG WITH OTHER PEOPLE: NOT DIFFICULT AT ALL
SUM OF ALL RESPONSES TO PHQ QUESTIONS 1-9: 0
SUM OF ALL RESPONSES TO PHQ QUESTIONS 1-9: 0
2. FEELING DOWN, DEPRESSED OR HOPELESS: NOT AT ALL
SUM OF ALL RESPONSES TO PHQ QUESTIONS 1-9: 0
1. LITTLE INTEREST OR PLEASURE IN DOING THINGS: NOT AT ALL

## 2024-04-04 ASSESSMENT — ENCOUNTER SYMPTOMS
DIARRHEA: 0
WHEEZING: 0
CONSTIPATION: 0
ABDOMINAL PAIN: 0
SHORTNESS OF BREATH: 0
SORE THROAT: 0
COUGH: 0

## 2024-04-04 NOTE — PROGRESS NOTES
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   HENT:  Negative for congestion, rhinorrhea and sore throat.    Respiratory:  Negative for cough, shortness of breath and wheezing.    Gastrointestinal:  Negative for abdominal pain, constipation and diarrhea.   Endocrine: Negative for polydipsia and polyuria.   Genitourinary:  Negative for dysuria, frequency and urgency.   Musculoskeletal:  Positive for arthralgias.   Neurological:  Negative for syncope, light-headedness, numbness and headaches.   Psychiatric/Behavioral:  Negative for sleep disturbance. The patient is not nervous/anxious.        Past Medical History:   Diagnosis Date    Asperger syndrome     Excessive sleepiness 1/10/2019    Heart murmur     Narcolepsy     Thyroglossal cyst      Past Surgical History:   Procedure Laterality Date    CIRCUMCISION      FRACTURE SURGERY      TYMPANOSTOMY TUBE PLACEMENT       Social History     Socioeconomic History    Marital status: Single     Spouse name: Not on file    Number of children: Not on file    Years of education: Not on file    Highest education level: Not on file

## 2024-10-28 DIAGNOSIS — L74.9 SWEATING ABNORMALITY: ICD-10-CM

## 2024-10-29 RX ORDER — GLYCOPYRROLATE 2 MG/1
2 TABLET ORAL 2 TIMES DAILY
Qty: 180 TABLET | Refills: 0 | Status: SHIPPED | OUTPATIENT
Start: 2024-10-29

## 2025-06-26 DIAGNOSIS — L74.9 SWEATING ABNORMALITY: ICD-10-CM

## 2025-06-27 RX ORDER — GLYCOPYRROLATE 2 MG/1
2 TABLET ORAL 2 TIMES DAILY
Qty: 180 TABLET | Refills: 0 | Status: SHIPPED | OUTPATIENT
Start: 2025-06-27